# Patient Record
Sex: FEMALE | Race: WHITE | Employment: FULL TIME | URBAN - METROPOLITAN AREA
[De-identification: names, ages, dates, MRNs, and addresses within clinical notes are randomized per-mention and may not be internally consistent; named-entity substitution may affect disease eponyms.]

---

## 2024-03-31 ENCOUNTER — HOSPITAL ENCOUNTER (OUTPATIENT)
Age: 49
Setting detail: OBSERVATION
Discharge: HOME | End: 2024-04-02
Attending: EMERGENCY MEDICINE | Admitting: FAMILY MEDICINE
Payer: COMMERCIAL

## 2024-03-31 ENCOUNTER — APPOINTMENT (OUTPATIENT)
Dept: GENERAL RADIOLOGY | Age: 49
End: 2024-03-31
Payer: COMMERCIAL

## 2024-03-31 DIAGNOSIS — R07.9 CHEST PAIN, UNSPECIFIED TYPE: Primary | ICD-10-CM

## 2024-03-31 DIAGNOSIS — R00.2 PALPITATIONS: ICD-10-CM

## 2024-03-31 DIAGNOSIS — R00.1 BRADYCARDIA: ICD-10-CM

## 2024-03-31 LAB
ALBUMIN SERPL-MCNC: 4.5 G/DL (ref 3.4–5)
ALBUMIN/GLOB SERPL: 1.5 {RATIO} (ref 1.1–2.2)
ALP SERPL-CCNC: 114 U/L (ref 40–129)
ALT SERPL-CCNC: 48 U/L (ref 10–40)
ANION GAP SERPL CALCULATED.3IONS-SCNC: 15 MMOL/L (ref 3–16)
ANTI-XA UNFRAC HEPARIN: <0.1 IU/ML (ref 0.3–0.7)
APTT BLD: 31.9 SEC (ref 22.7–35.9)
AST SERPL-CCNC: 28 U/L (ref 15–37)
BASOPHILS # BLD: 0 K/UL (ref 0–0.2)
BASOPHILS NFR BLD: 0.9 %
BILIRUB SERPL-MCNC: 0.5 MG/DL (ref 0–1)
BUN SERPL-MCNC: 12 MG/DL (ref 7–20)
CALCIUM SERPL-MCNC: 9.9 MG/DL (ref 8.3–10.6)
CHLORIDE SERPL-SCNC: 100 MMOL/L (ref 99–110)
CO2 SERPL-SCNC: 23 MMOL/L (ref 21–32)
CREAT SERPL-MCNC: 0.7 MG/DL (ref 0.6–1.1)
DEPRECATED RDW RBC AUTO: 13 % (ref 12.4–15.4)
EOSINOPHIL # BLD: 0.1 K/UL (ref 0–0.6)
EOSINOPHIL NFR BLD: 1.9 %
GFR SERPLBLD CREATININE-BSD FMLA CKD-EPI: >90 ML/MIN/{1.73_M2}
GLUCOSE SERPL-MCNC: 131 MG/DL (ref 70–99)
HCG SERPL QL: NEGATIVE
HCT VFR BLD AUTO: 42.8 % (ref 36–48)
HGB BLD-MCNC: 14.3 G/DL (ref 12–16)
INR PPP: 1.04 (ref 0.84–1.16)
LYMPHOCYTES # BLD: 0.9 K/UL (ref 1–5.1)
LYMPHOCYTES NFR BLD: 15.8 %
MCH RBC QN AUTO: 31.6 PG (ref 26–34)
MCHC RBC AUTO-ENTMCNC: 33.4 G/DL (ref 31–36)
MCV RBC AUTO: 94.6 FL (ref 80–100)
MONOCYTES # BLD: 0.4 K/UL (ref 0–1.3)
MONOCYTES NFR BLD: 7.8 %
NEUTROPHILS # BLD: 4.1 K/UL (ref 1.7–7.7)
NEUTROPHILS NFR BLD: 73.6 %
PLATELET # BLD AUTO: 209 K/UL (ref 135–450)
PMV BLD AUTO: 7.7 FL (ref 5–10.5)
POTASSIUM SERPL-SCNC: 3.6 MMOL/L (ref 3.5–5.1)
PROT SERPL-MCNC: 7.6 G/DL (ref 6.4–8.2)
PROTHROMBIN TIME: 13.6 SEC (ref 11.5–14.8)
RBC # BLD AUTO: 4.53 M/UL (ref 4–5.2)
SODIUM SERPL-SCNC: 138 MMOL/L (ref 136–145)
TROPONIN, HIGH SENSITIVITY: 12 NG/L (ref 0–14)
TROPONIN, HIGH SENSITIVITY: 21 NG/L (ref 0–14)
TROPONIN, HIGH SENSITIVITY: 25 NG/L (ref 0–14)
TROPONIN, HIGH SENSITIVITY: 30 NG/L (ref 0–14)
TROPONIN, HIGH SENSITIVITY: 7 NG/L (ref 0–14)
WBC # BLD AUTO: 5.6 K/UL (ref 4–11)

## 2024-03-31 PROCEDURE — 85025 COMPLETE CBC W/AUTO DIFF WBC: CPT

## 2024-03-31 PROCEDURE — 84439 ASSAY OF FREE THYROXINE: CPT

## 2024-03-31 PROCEDURE — G0378 HOSPITAL OBSERVATION PER HR: HCPCS

## 2024-03-31 PROCEDURE — 96366 THER/PROPH/DIAG IV INF ADDON: CPT

## 2024-03-31 PROCEDURE — 85520 HEPARIN ASSAY: CPT

## 2024-03-31 PROCEDURE — 71045 X-RAY EXAM CHEST 1 VIEW: CPT

## 2024-03-31 PROCEDURE — 84484 ASSAY OF TROPONIN QUANT: CPT

## 2024-03-31 PROCEDURE — 36415 COLL VENOUS BLD VENIPUNCTURE: CPT

## 2024-03-31 PROCEDURE — 99285 EMERGENCY DEPT VISIT HI MDM: CPT

## 2024-03-31 PROCEDURE — 6370000000 HC RX 637 (ALT 250 FOR IP): Performed by: EMERGENCY MEDICINE

## 2024-03-31 PROCEDURE — 80053 COMPREHEN METABOLIC PANEL: CPT

## 2024-03-31 PROCEDURE — 85610 PROTHROMBIN TIME: CPT

## 2024-03-31 PROCEDURE — 6370000000 HC RX 637 (ALT 250 FOR IP): Performed by: NURSE PRACTITIONER

## 2024-03-31 PROCEDURE — 84703 CHORIONIC GONADOTROPIN ASSAY: CPT

## 2024-03-31 PROCEDURE — 6360000002 HC RX W HCPCS: Performed by: NURSE PRACTITIONER

## 2024-03-31 PROCEDURE — 96375 TX/PRO/DX INJ NEW DRUG ADDON: CPT

## 2024-03-31 PROCEDURE — 6360000002 HC RX W HCPCS: Performed by: EMERGENCY MEDICINE

## 2024-03-31 PROCEDURE — 84443 ASSAY THYROID STIM HORMONE: CPT

## 2024-03-31 PROCEDURE — 96374 THER/PROPH/DIAG INJ IV PUSH: CPT

## 2024-03-31 PROCEDURE — 85730 THROMBOPLASTIN TIME PARTIAL: CPT

## 2024-03-31 PROCEDURE — 96365 THER/PROPH/DIAG IV INF INIT: CPT

## 2024-03-31 PROCEDURE — 93005 ELECTROCARDIOGRAM TRACING: CPT | Performed by: EMERGENCY MEDICINE

## 2024-03-31 RX ORDER — ACETAMINOPHEN 650 MG/1
650 SUPPOSITORY RECTAL EVERY 6 HOURS PRN
Status: DISCONTINUED | OUTPATIENT
Start: 2024-03-31 | End: 2024-04-02 | Stop reason: HOSPADM

## 2024-03-31 RX ORDER — ASPIRIN 81 MG/1
81 TABLET, CHEWABLE ORAL DAILY
Status: DISCONTINUED | OUTPATIENT
Start: 2024-04-01 | End: 2024-04-02 | Stop reason: HOSPADM

## 2024-03-31 RX ORDER — AMLODIPINE BESYLATE 2.5 MG/1
2.5 TABLET ORAL DAILY
COMMUNITY
Start: 2024-03-27

## 2024-03-31 RX ORDER — METOPROLOL SUCCINATE 50 MG/1
50 TABLET, EXTENDED RELEASE ORAL NIGHTLY
Status: DISCONTINUED | OUTPATIENT
Start: 2024-03-31 | End: 2024-03-31

## 2024-03-31 RX ORDER — ONDANSETRON 2 MG/ML
4 INJECTION INTRAMUSCULAR; INTRAVENOUS EVERY 6 HOURS PRN
Status: DISCONTINUED | OUTPATIENT
Start: 2024-03-31 | End: 2024-04-02 | Stop reason: HOSPADM

## 2024-03-31 RX ORDER — SODIUM CHLORIDE 0.9 % (FLUSH) 0.9 %
5-40 SYRINGE (ML) INJECTION PRN
Status: DISCONTINUED | OUTPATIENT
Start: 2024-03-31 | End: 2024-04-02 | Stop reason: HOSPADM

## 2024-03-31 RX ORDER — METOPROLOL SUCCINATE 25 MG/1
25 TABLET, EXTENDED RELEASE ORAL NIGHTLY
Status: DISCONTINUED | OUTPATIENT
Start: 2024-03-31 | End: 2024-04-02 | Stop reason: HOSPADM

## 2024-03-31 RX ORDER — SODIUM CHLORIDE 0.9 % (FLUSH) 0.9 %
5-40 SYRINGE (ML) INJECTION EVERY 12 HOURS SCHEDULED
Status: DISCONTINUED | OUTPATIENT
Start: 2024-03-31 | End: 2024-04-02 | Stop reason: HOSPADM

## 2024-03-31 RX ORDER — CLORAZEPATE DIPOTASSIUM 7.5 MG/1
7.5 TABLET ORAL 2 TIMES DAILY PRN
COMMUNITY

## 2024-03-31 RX ORDER — HEPARIN SODIUM 1000 [USP'U]/ML
4000 INJECTION, SOLUTION INTRAVENOUS; SUBCUTANEOUS PRN
Status: DISCONTINUED | OUTPATIENT
Start: 2024-03-31 | End: 2024-04-01

## 2024-03-31 RX ORDER — BUSPIRONE HYDROCHLORIDE 5 MG/1
10 TABLET ORAL DAILY
Status: DISCONTINUED | OUTPATIENT
Start: 2024-04-01 | End: 2024-04-01

## 2024-03-31 RX ORDER — ENOXAPARIN SODIUM 100 MG/ML
1 INJECTION SUBCUTANEOUS ONCE
Status: DISCONTINUED | OUTPATIENT
Start: 2024-03-31 | End: 2024-03-31

## 2024-03-31 RX ORDER — HEPARIN SODIUM 1000 [USP'U]/ML
4000 INJECTION, SOLUTION INTRAVENOUS; SUBCUTANEOUS ONCE
Status: COMPLETED | OUTPATIENT
Start: 2024-03-31 | End: 2024-03-31

## 2024-03-31 RX ORDER — HEPARIN SODIUM 10000 [USP'U]/100ML
1000 INJECTION, SOLUTION INTRAVENOUS CONTINUOUS
Status: DISCONTINUED | OUTPATIENT
Start: 2024-03-31 | End: 2024-04-01

## 2024-03-31 RX ORDER — HEPARIN SODIUM 1000 [USP'U]/ML
2000 INJECTION, SOLUTION INTRAVENOUS; SUBCUTANEOUS PRN
Status: DISCONTINUED | OUTPATIENT
Start: 2024-03-31 | End: 2024-04-01

## 2024-03-31 RX ORDER — ATORVASTATIN CALCIUM 40 MG/1
40 TABLET, FILM COATED ORAL NIGHTLY
Status: DISCONTINUED | OUTPATIENT
Start: 2024-03-31 | End: 2024-04-02 | Stop reason: HOSPADM

## 2024-03-31 RX ORDER — POLYETHYLENE GLYCOL 3350 17 G/17G
17 POWDER, FOR SOLUTION ORAL DAILY PRN
Status: DISCONTINUED | OUTPATIENT
Start: 2024-03-31 | End: 2024-04-02 | Stop reason: HOSPADM

## 2024-03-31 RX ORDER — ACETAMINOPHEN 325 MG/1
650 TABLET ORAL EVERY 6 HOURS PRN
Status: DISCONTINUED | OUTPATIENT
Start: 2024-03-31 | End: 2024-04-02 | Stop reason: HOSPADM

## 2024-03-31 RX ORDER — ASPIRIN 325 MG
325 TABLET ORAL ONCE
Status: COMPLETED | OUTPATIENT
Start: 2024-03-31 | End: 2024-03-31

## 2024-03-31 RX ORDER — LORAZEPAM 2 MG/ML
0.5 INJECTION INTRAMUSCULAR ONCE
Status: COMPLETED | OUTPATIENT
Start: 2024-03-31 | End: 2024-03-31

## 2024-03-31 RX ORDER — HYDROXYZINE HYDROCHLORIDE 10 MG/1
25 TABLET, FILM COATED ORAL 3 TIMES DAILY PRN
Status: DISCONTINUED | OUTPATIENT
Start: 2024-03-31 | End: 2024-04-02 | Stop reason: HOSPADM

## 2024-03-31 RX ORDER — METOPROLOL TARTRATE 50 MG/1
50 TABLET, FILM COATED ORAL NIGHTLY
Status: DISCONTINUED | OUTPATIENT
Start: 2024-03-31 | End: 2024-03-31

## 2024-03-31 RX ORDER — SODIUM CHLORIDE 9 MG/ML
INJECTION, SOLUTION INTRAVENOUS PRN
Status: DISCONTINUED | OUTPATIENT
Start: 2024-03-31 | End: 2024-04-02 | Stop reason: HOSPADM

## 2024-03-31 RX ORDER — LORAZEPAM 1 MG/1
1 TABLET ORAL 2 TIMES DAILY
Status: DISCONTINUED | OUTPATIENT
Start: 2024-03-31 | End: 2024-04-01

## 2024-03-31 RX ORDER — ONDANSETRON 4 MG/1
4 TABLET, ORALLY DISINTEGRATING ORAL EVERY 8 HOURS PRN
Status: DISCONTINUED | OUTPATIENT
Start: 2024-03-31 | End: 2024-04-02 | Stop reason: HOSPADM

## 2024-03-31 RX ADMIN — LORAZEPAM 0.5 MG: 2 INJECTION INTRAMUSCULAR; INTRAVENOUS at 20:07

## 2024-03-31 RX ADMIN — ATORVASTATIN CALCIUM 40 MG: 40 TABLET, FILM COATED ORAL at 22:03

## 2024-03-31 RX ADMIN — NITROGLYCERIN 0.5 INCH: 20 OINTMENT TOPICAL at 20:06

## 2024-03-31 RX ADMIN — HEPARIN SODIUM 1000 UNITS/HR: 10000 INJECTION, SOLUTION INTRAVENOUS at 22:04

## 2024-03-31 RX ADMIN — ASPIRIN 325 MG: 325 TABLET ORAL at 20:07

## 2024-03-31 RX ADMIN — HEPARIN SODIUM 4000 UNITS: 1000 INJECTION INTRAVENOUS; SUBCUTANEOUS at 22:04

## 2024-03-31 ASSESSMENT — PAIN SCALES - GENERAL
PAINLEVEL_OUTOF10: 0
PAINLEVEL_OUTOF10: 0

## 2024-03-31 ASSESSMENT — PAIN - FUNCTIONAL ASSESSMENT: PAIN_FUNCTIONAL_ASSESSMENT: 0-10

## 2024-03-31 ASSESSMENT — LIFESTYLE VARIABLES
HOW MANY STANDARD DRINKS CONTAINING ALCOHOL DO YOU HAVE ON A TYPICAL DAY: 3 OR 4
HOW OFTEN DO YOU HAVE A DRINK CONTAINING ALCOHOL: 4 OR MORE TIMES A WEEK

## 2024-03-31 NOTE — ED PROVIDER NOTES
Emergency Department Provider Note  Location: [facility]  ED  3/31/2024     Patient Identification  Pat Prieto is a 48 y.o. female    Chief Complaint  Tachycardia (Began taking amlodipine yesterday and has felt palpitations, weakness, and states she's been getting flushed )          HPI  (History provided by patient)  Patient is a 48-year-old female with a history of hypertension and anxiety GERD daily alcohol use who presents after palpitations and facial flushing today.  She reports she recently started amlodipine took her first dose yesterday and took second dose this morning 5 in the morning.  Reports that she noticed facial flushing in the morning and then had episode few hours ago of \"my heart was fluttering in my chest\".  She reports that she did feel that anxious but her ex  was over and she was little worked up over this.  She reports this is different than her usual anxiety attack because the fluttering was different and it did not go away.  It did resolve by the time she arrived here but she still has a mild residual tightness in the center of her chest.  Associated with nausea but no diaphoresis no shortness of breath.  Patient reports she drank 1 cup of coffee earlier today not unusual for her.  She did drink 4 glasses of wine last night which is not unusual for her.  No history of thyroid disease.      Nursing Notes were all reviewed and agreed with, or any disagreements were addressed in the HPI:  Allergies:   Allergies   Allergen Reactions    Levofloxacin     Penicillins     Sulfa Antibiotics        Past medical history:  has a past medical history of Acute sinusitis, Alcohol abuse, Anxiety, Anxious mood, Depression, GERD (gastroesophageal reflux disease), Hypertension, and Migraine.    Past surgical history:  has a past surgical history that includes Cholecystectomy.    Home medications:   Prior to Admission medications    Medication Sig Start Date End Date Taking?

## 2024-04-01 ENCOUNTER — APPOINTMENT (OUTPATIENT)
Dept: CT IMAGING | Age: 49
End: 2024-04-01
Payer: COMMERCIAL

## 2024-04-01 LAB
ANTI-XA UNFRAC HEPARIN: 0.38 IU/ML (ref 0.3–0.7)
ANTI-XA UNFRAC HEPARIN: 0.41 IU/ML (ref 0.3–0.7)
CHOLEST SERPL-MCNC: 184 MG/DL (ref 0–199)
DEPRECATED RDW RBC AUTO: 13 % (ref 12.4–15.4)
EKG ATRIAL RATE: 56 BPM
EKG DIAGNOSIS: NORMAL
EKG P AXIS: 56 DEGREES
EKG P-R INTERVAL: 174 MS
EKG Q-T INTERVAL: 426 MS
EKG QRS DURATION: 98 MS
EKG QTC CALCULATION (BAZETT): 411 MS
EKG R AXIS: -10 DEGREES
EKG T AXIS: 31 DEGREES
EKG VENTRICULAR RATE: 56 BPM
HCT VFR BLD AUTO: 41 % (ref 36–48)
HDLC SERPL-MCNC: 95 MG/DL (ref 40–60)
HGB BLD-MCNC: 13.9 G/DL (ref 12–16)
LDLC SERPL CALC-MCNC: 73 MG/DL
MCH RBC QN AUTO: 32 PG (ref 26–34)
MCHC RBC AUTO-ENTMCNC: 34 G/DL (ref 31–36)
MCV RBC AUTO: 94.3 FL (ref 80–100)
PLATELET # BLD AUTO: 200 K/UL (ref 135–450)
PMV BLD AUTO: 8 FL (ref 5–10.5)
RBC # BLD AUTO: 4.34 M/UL (ref 4–5.2)
T4 FREE SERPL-MCNC: 1.5 NG/DL (ref 0.9–1.8)
TRIGL SERPL-MCNC: 81 MG/DL (ref 0–150)
TROPONIN, HIGH SENSITIVITY: 15 NG/L (ref 0–14)
TROPONIN, HIGH SENSITIVITY: 20 NG/L (ref 0–14)
TSH SERPL DL<=0.005 MIU/L-ACNC: 4.42 UIU/ML (ref 0.27–4.2)
VLDLC SERPL CALC-MCNC: 16 MG/DL
WBC # BLD AUTO: 5.5 K/UL (ref 4–11)

## 2024-04-01 PROCEDURE — 2580000003 HC RX 258: Performed by: NURSE PRACTITIONER

## 2024-04-01 PROCEDURE — 6360000004 HC RX CONTRAST MEDICATION: Performed by: STUDENT IN AN ORGANIZED HEALTH CARE EDUCATION/TRAINING PROGRAM

## 2024-04-01 PROCEDURE — 93010 ELECTROCARDIOGRAM REPORT: CPT | Performed by: INTERNAL MEDICINE

## 2024-04-01 PROCEDURE — 80061 LIPID PANEL: CPT

## 2024-04-01 PROCEDURE — G0378 HOSPITAL OBSERVATION PER HR: HCPCS

## 2024-04-01 PROCEDURE — 96366 THER/PROPH/DIAG IV INF ADDON: CPT

## 2024-04-01 PROCEDURE — 84484 ASSAY OF TROPONIN QUANT: CPT

## 2024-04-01 PROCEDURE — 99222 1ST HOSP IP/OBS MODERATE 55: CPT | Performed by: STUDENT IN AN ORGANIZED HEALTH CARE EDUCATION/TRAINING PROGRAM

## 2024-04-01 PROCEDURE — 6370000000 HC RX 637 (ALT 250 FOR IP): Performed by: INTERNAL MEDICINE

## 2024-04-01 PROCEDURE — 75574 CT ANGIO HRT W/3D IMAGE: CPT

## 2024-04-01 PROCEDURE — 85027 COMPLETE CBC AUTOMATED: CPT

## 2024-04-01 PROCEDURE — 6370000000 HC RX 637 (ALT 250 FOR IP): Performed by: NURSE PRACTITIONER

## 2024-04-01 PROCEDURE — 85520 HEPARIN ASSAY: CPT

## 2024-04-01 PROCEDURE — 93306 TTE W/DOPPLER COMPLETE: CPT

## 2024-04-01 PROCEDURE — 6370000000 HC RX 637 (ALT 250 FOR IP): Performed by: STUDENT IN AN ORGANIZED HEALTH CARE EDUCATION/TRAINING PROGRAM

## 2024-04-01 PROCEDURE — 83695 ASSAY OF LIPOPROTEIN(A): CPT

## 2024-04-01 PROCEDURE — 36415 COLL VENOUS BLD VENIPUNCTURE: CPT

## 2024-04-01 RX ORDER — CETIRIZINE HYDROCHLORIDE 10 MG/1
10 TABLET ORAL DAILY PRN
Status: DISCONTINUED | OUTPATIENT
Start: 2024-04-01 | End: 2024-04-02 | Stop reason: HOSPADM

## 2024-04-01 RX ORDER — LORAZEPAM 1 MG/1
1 TABLET ORAL 2 TIMES DAILY PRN
Status: DISCONTINUED | OUTPATIENT
Start: 2024-04-01 | End: 2024-04-02 | Stop reason: HOSPADM

## 2024-04-01 RX ORDER — PANTOPRAZOLE SODIUM 40 MG/1
40 TABLET, DELAYED RELEASE ORAL
Status: DISCONTINUED | OUTPATIENT
Start: 2024-04-02 | End: 2024-04-02 | Stop reason: HOSPADM

## 2024-04-01 RX ORDER — AMLODIPINE BESYLATE 2.5 MG/1
2.5 TABLET ORAL DAILY
Status: DISCONTINUED | OUTPATIENT
Start: 2024-04-01 | End: 2024-04-02 | Stop reason: HOSPADM

## 2024-04-01 RX ADMIN — NITROGLYCERIN 1 INCH: 20 OINTMENT TOPICAL at 12:54

## 2024-04-01 RX ADMIN — NITROGLYCERIN 1 INCH: 20 OINTMENT TOPICAL at 18:47

## 2024-04-01 RX ADMIN — ATORVASTATIN CALCIUM 40 MG: 40 TABLET, FILM COATED ORAL at 21:05

## 2024-04-01 RX ADMIN — AMLODIPINE BESYLATE 2.5 MG: 2.5 TABLET ORAL at 12:54

## 2024-04-01 RX ADMIN — LORAZEPAM 1 MG: 1 TABLET ORAL at 08:35

## 2024-04-01 RX ADMIN — IOPAMIDOL 100 ML: 755 INJECTION, SOLUTION INTRAVENOUS at 15:06

## 2024-04-01 RX ADMIN — Medication 10 ML: at 21:05

## 2024-04-01 RX ADMIN — ACETAMINOPHEN 650 MG: 325 TABLET ORAL at 21:05

## 2024-04-01 RX ADMIN — ASPIRIN 81 MG: 81 TABLET, CHEWABLE ORAL at 08:35

## 2024-04-01 RX ADMIN — ACETAMINOPHEN 650 MG: 325 TABLET ORAL at 08:34

## 2024-04-01 RX ADMIN — CETIRIZINE HYDROCHLORIDE 10 MG: 10 TABLET, FILM COATED ORAL at 09:01

## 2024-04-01 RX ADMIN — LORAZEPAM 1 MG: 1 TABLET ORAL at 21:05

## 2024-04-01 ASSESSMENT — PAIN SCALES - GENERAL
PAINLEVEL_OUTOF10: 0

## 2024-04-01 NOTE — PLAN OF CARE
Problem: Cardiovascular - Adult  Goal: Maintains optimal cardiac output and hemodynamic stability  Outcome: Progressing  Flowsheets (Taken 4/1/2024 0015)  Maintains optimal cardiac output and hemodynamic stability:   Monitor blood pressure and heart rate   Monitor urine output and notify Licensed Independent Practitioner for values outside of normal range   Assess for signs of decreased cardiac output     Problem: Cardiovascular - Adult  Goal: Absence of cardiac dysrhythmias or at baseline  Outcome: Progressing  Flowsheets (Taken 4/1/2024 0015)  Absence of cardiac dysrhythmias or at baseline:   Monitor cardiac rate and rhythm   Assess for signs of decreased cardiac output

## 2024-04-01 NOTE — CONSULTS
Pharmacy to Manage Heparin Infusion per Hospital Nomogram    Dx: ACS  Pt wt = 83.3 kg .  Baseline aPTT = Pending    Oral factor Xa-inhibitors may alter and elevate anti-Xa levels used for unfractionated heparin monitoring. As a result, anti-Xa monitoring is not accurate while Xa-inhibitor activity is detectable. Utilize aPTT monitoring when patient received an oral factor Xa-inhibitor (apixaban, betrixaban, edoxaban or rivaroxaban) within 72 hours prior to admission (please document last administration time). The goal is to allow a washout of oral factor Xa-inhibitors by using aPTT for 72 hours, then change to ant-Xa levels for UFH.     Heparin (weight-based) Infusion: CAD/STEMI/NSTEMI/UA/AFib)   Heparin 60 units/kg IVP bolus (max 4,000 units) followed by Heparin infusion at 12 units/kg/hr (recommended max initial rate: 1000 units/hr).  Recheck anti-Xa (unless aPTT being used) in 6 hours.  Goal anti-Xa 0.3-0.7 IU/mL  Goal aPTT =  seconds.  Gama Cardona, Aydin  3/31/2024 9:52 PM    -------------------------------------  4/1 0304  Low-Dose Heparin Drip  Current Rate: 1000 units/hr  4/1 @ 0201 Anti-Xa Level= 0.41  Plan: Per pharmacy dosing, we will not make any changes at this time    Next Anti-Xa Level: 4/1 @ 0800  Muna Chiu PharmD 4/1/2024 3:04 AM  -------------------------------------    4/1/2024  anti-Xa level = 0.38 IU/mL at 0844  Continue Heparin infusion rate at 1000 units/hr   Daily level ordered  Next anti-Xa tomorrow (4/2) AM  Ita Disla PharmD  -------------------------------------      4/1/2024 9:17 AM   
Consult Placed     Who: [facility]  Date: 4/1/2024   Time: 8:26 AM   
auscultation bilaterally, no wheezes, no rales, no respiratory distress   Chest Wall:  No deformity or tenderness to palpation   Heart:  Regular rate and rhythm, normal S1, normal S2, no murmur, no rub, no S3/S4, PMI non-displaced.    Abdomen:   Soft, non-tender, with normoactive bowel sounds.    Extremities: No cyanosis, clubbing or pitting edema.    Vascular: 2+ radial. Brisk carotid upstrokes without carotid bruit.    Skin: Skin color, texture, turgor are normal with no rashes or ulceration.    Pysch: Euthymic mood, appropriate affect   Neurologic: Oriented to person, place and time. No slurred speech or facial asymmetry. No motor or sensory deficits on gross examination.         Labs:   CBC:   Lab Results   Component Value Date/Time    WBC 5.5 04/01/2024 02:01 AM    RBC 4.34 04/01/2024 02:01 AM    HGB 13.9 04/01/2024 02:01 AM    HCT 41.0 04/01/2024 02:01 AM    MCV 94.3 04/01/2024 02:01 AM    RDW 13.0 04/01/2024 02:01 AM     04/01/2024 02:01 AM     CMP:  Lab Results   Component Value Date/Time     03/31/2024 05:00 PM    K 3.6 03/31/2024 05:00 PM     03/31/2024 05:00 PM    CO2 23 03/31/2024 05:00 PM    BUN 12 03/31/2024 05:00 PM    CREATININE 0.7 03/31/2024 05:00 PM    GFRAA >60 07/21/2014 10:25 PM    GFRAA >60 05/01/2013 10:05 AM    AGRATIO 1.5 03/31/2024 05:00 PM    LABGLOM >90 03/31/2024 05:00 PM    GLUCOSE 131 03/31/2024 05:00 PM    PROT 7.6 03/31/2024 05:00 PM    PROT 7.1 03/09/2012 03:05 PM    CALCIUM 9.9 03/31/2024 05:00 PM    BILITOT 0.5 03/31/2024 05:00 PM    ALKPHOS 114 03/31/2024 05:00 PM    AST 28 03/31/2024 05:00 PM    ALT 48 03/31/2024 05:00 PM     PT/INR:  No results found for: \"PTINR\"  HgBA1c:No results found for: \"LABA1C\"  Lab Results   Component Value Date    TROPONINI <0.006 03/09/2012       Lab Results   Component Value Date    CHOL 151 05/01/2013     Lab Results   Component Value Date    TRIG 46 05/01/2013     Lab Results   Component Value Date    HDL 83 (H) 05/01/2013

## 2024-04-01 NOTE — H&P
[facility] Medicine History & Physical        Date of Service: 03/31/2024    Time of Service: 2010    Disposition:    []Admitted to inpatient status with expected LOS greater than two midnights due to medical therapy.  [x]Placed in observation status.    Historian: Self/patient, chart review, Care Everywhere, ED documentation    Chief Admission Complaint:  Chest pressure, increased palpitations, facial flushing    Presenting Admission History:      Pat Prieto is a/an 48 y.o. female with a significant past medical history of hypertension, palpitations, chronic alcohol abuse, GERD, and depression with anxiety who presents to [facility] ED with complaint of facial flushing, increasing anxiety, palpitations, and chest pressure that all began acutely around 1400 today. She notes a longstanding history of palpitations, has been on atenolol long term that was successful in treating them, but was recently switched to Toprol XL 25 mg nightly.  She notes that she was at rest during the onset of the symptoms, began feeling like she was having excessive palpitations and the sensation of tachycardia, began feeling hot in the face and also having chest pressure on the bottom portion.  She denies any true pain, pressure is nonradiating, did not have any diaphoresis, no nausea vomiting.  These symptoms lasted for approximate 2 to 3 hours, did not intensify or improve, which prompted her to come here for an evaluation. Of note, she recently was started on amlodipine for BP control, first dose yesterday, last dose today during the morning. Her evaluation included laboratory studies, EKG, and chest x-ray.  Chest x-ray was negative for any acute findings.  EKG showed sinus bradycardia with a ventricular rate of 56 bpm without any acute ST segment or T wave changes.  Laboratory studies were reviewed and pertinent for normal electrolytes, normal renal function, normal cell count; troponin did trend upward from 7-12 then to 21 and now 30.

## 2024-04-01 NOTE — PLAN OF CARE
4 Eyes Skin Assessment     NAME:  Pat Prieto  YOB: 1975  MEDICAL RECORD NUMBER:  [id number]    The patient is being assessed for  Admission    I agree that at least one RN has performed a thorough Head to Toe Skin Assessment on the patient. ALL assessment sites listed below have been assessed.      Areas assessed by both nurses:    Head, Face, Ears, Shoulders, Back, Chest, Arms, Elbows, Hands, Sacrum. Buttock, Coccyx, Ischium, Legs. Feet and Heels, and Under Medical Devices         Does the Patient have a Wound? No noted wound(s)       Tim Prevention initiated by RN: No  Wound Care Orders initiated by RN: No    Pressure Injury (Stage 3,4, Unstageable, DTI, NWPT, and Complex wounds) if present, place Wound referral order by RN under : No    New Ostomies, if present place, Ostomy referral order under : No     Nurse 1 eSignature: Electronically signed by Fernanda Paul RN on 4/1/24 at 3:33 AM EDT    **SHARE this note so that the co-signing nurse can place an eSignature**    Nurse 2 eSignature: {Esignature:[id number]}

## 2024-04-01 NOTE — ACP (ADVANCE CARE PLANNING)
Advance Care Planning     General Advance Care Planning (ACP) Conversation    Date of Conversation: 3/31/2024  Conducted with: Patient with Decision Making Capacity   Healthcare Decision Maker: Next of Kin by law (only applies in absence of above) (name) juan carlos Prieto [phone]    Healthcare Decision Maker:  No healthcare decision makers have been documented.  Click here to complete HealthCare Decision Makers including selection of the Healthcare Decision Maker Relationship (ie \"Primary\")  Today we documented Decision Maker(s) consistent with Legal Next of Kin hierarchy.    Content/Action Overview:  Has NO ACP documents-Information provided  Reviewed DNR/DNI and patient elects Full Code (Attempt Resuscitation)      Length of Voluntary ACP Conversation in minutes:  <16 minutes (Non-Billable)    India Bowles RN

## 2024-04-01 NOTE — CARE COORDINATION
Case Management Assessment  Initial Evaluation    Date/Time of Evaluation: 4/1/2024 12:54 PM  Assessment Completed by: India Bowles RN    If patient is discharged prior to next notation, then this note serves as note for discharge by case management.    Patient Name: Pat Prieto                   YOB: 1975  Diagnosis: Palpitations [R00.2]  Chest pain [R07.9]  Chest pain, unspecified type [R07.9]                   Date / Time: 3/31/2024  4:45 PM    Patient Admission Status: Observation   Readmission Risk (Low < 19, Mod (19-27), High > 27): No data recorded  Current PCP: Alex Prather MD  PCP verified by CM?      Chart Reviewed: Yes      History Provided by: Patient  Patient Orientation: Alert and Oriented, Person, Place, Situation    Patient Cognition: Alert    Hospitalization in the last 30 days (Readmission):  No    If yes, Readmission Assessment in  Navigator will be completed.    Advance Directives:      Code Status: Full Code   Patient's Primary Decision Maker is: Legal Next of Kin      Discharge Planning:    Patient lives with: Children, Family Members Type of Home: House  Primary Care Giver: Self  Patient Support Systems include: Children, Family Members   Current Financial resources:    Current community resources:    Current services prior to admission: None            Current DME:              Type of Home Care services:  None    ADLS  Prior functional level:    Current functional level:      PT AM-PAC:   /24  OT AM-PAC:   /24    Family can provide assistance at DC:    Would you like Case Management to discuss the discharge plan with any other family members/significant others, and if so, who?    Plans to Return to Present Housing:    Other Identified Issues/Barriers to RETURNING to current housing:   Potential Assistance needed at discharge: N/A            Potential DME:    Patient expects to discharge to: House  Plan for transportation at discharge:      Financial    Payor:

## 2024-04-01 NOTE — ACP (ADVANCE CARE PLANNING)
Advance Care Planning     Advance Care Planning Inpatient Note  [facility] Department    Today's Date: 4/1/2024  Unit: [facility] B3 - MED SURG    Received request from IDT Member.  Upon review of chart and communication with care team, patient's decision making abilities are not in question.. Patient and Child/Children was/were present in the room during visit.    Goals of ACP Conversation:  Discuss advance care planning documents    Health Care Decision Makers:     Summary: Patient is not legally .  She has 3 adult children.  She is unsure who she would pick to be a proxy decision maker, but is considering her daughter who has more medical background.  No Decision Maker named by patient at this time    Advance Care Planning Documents (Patient Wishes):  None     Assessment:  Patient states she knows it is important to understand who would be best for the job and understands all three of her children would have equal decision making ability, unless she completes a legal document stating otherwise.  Interventions:  Provided education on documents for clarity and greater understanding  Discussed and provided education on state decision maker hierarchy  Encouraged ongoing ACP conversation with future decision makers and loved ones    Care Preferences Communicated:   Ventilation:   If the patient, in their present state of health, suddenly became very ill and unable to breathe on their own,     the patient would desire the use of a ventilator (breathing machine).    If their health worsens and it becomes clear that the change of recovery is unlikely,     Patient is uncertain what her choices would be and would want decisions based on the condition at the time.    Resuscitation:  Patient currently wants to be a full code and is uncertain what she would choose for a more serious condition.       Outcomes/Plan:  ACP Discussion: Completed.  Patient knows all three of her adult children would be her proxy at this point.

## 2024-04-01 NOTE — ED NOTES
Ms. Prieto is a 48 y.o. female who had concerns including Tachycardia (Began taking amlodipine yesterday and has felt palpitations, weakness, and states she's been getting flushed ).    Chief Complaint   Patient presents with    Tachycardia     Began taking amlodipine yesterday and has felt palpitations, weakness, and states she's been getting flushed        She is being admitted for:    Chest pain    Her ED problem list included:    1. Chest pain, unspecified type    2. Palpitations        Past Medical History:   Diagnosis Date    Acute sinusitis     Alcohol abuse     Anxiety     Anxious mood     Depression     GERD (gastroesophageal reflux disease)     Hypertension     Migraine        Past Surgical History:   Procedure Laterality Date    CHOLECYSTECTOMY         Her recent abnormal labs were:    Labs Reviewed   CBC WITH AUTO DIFFERENTIAL - Abnormal; Notable for the following components:       Result Value    Lymphocytes Absolute 0.9 (*)     All other components within normal limits   COMPREHENSIVE METABOLIC PANEL W/ REFLEX TO MG FOR LOW K - Abnormal; Notable for the following components:    Glucose 131 (*)     ALT 48 (*)     All other components within normal limits   TROPONIN - Abnormal; Notable for the following components:    Troponin, High Sensitivity 21 (*)     All other components within normal limits   TROPONIN - Abnormal; Notable for the following components:    Troponin, High Sensitivity 30 (*)     All other components within normal limits   TROPONIN   TROPONIN   TSH WITH REFLEX TO FT4       Her vital signs for the encounter were:    Patient Vitals for the past 24 hrs:   BP Temp Temp src Pulse Resp SpO2 Height Weight   03/31/24 2006 (!) 158/88 -- -- 58 -- -- -- --   03/31/24 1741 (!) 147/74 -- -- 58 11 100 % -- --   03/31/24 1650 (!) 163/93 97.5 °F (36.4 °C) Oral 69 18 100 % 1.727 m (5' 8\") 85.7 kg (189 lb)        She has the following lines:    Peripheral IV 03/31/24 Right Forearm (Active)       She has

## 2024-04-01 NOTE — PLAN OF CARE
Problem: Discharge Planning  Goal: Discharge to home or other facility with appropriate resources  Outcome: Progressing  Flowsheets (Taken 4/1/2024 0912 by Tracey Marino, DELMY)  Discharge to home or other facility with appropriate resources: Identify barriers to discharge with patient and caregiver     Problem: Pain  Goal: Verbalizes/displays adequate comfort level or baseline comfort level  Outcome: Progressing     Problem: Cardiovascular - Adult  Goal: Maintains optimal cardiac output and hemodynamic stability  4/1/2024 1212 by Vicenta Velez  Outcome: Progressing  Flowsheets (Taken 4/1/2024 0912 by Tracey Marino RN)  Maintains optimal cardiac output and hemodynamic stability: Monitor blood pressure and heart rate  4/1/2024 0015 by Fernanda Paul RN  Outcome: Progressing  Flowsheets (Taken 4/1/2024 0015)  Maintains optimal cardiac output and hemodynamic stability:   Monitor blood pressure and heart rate   Monitor urine output and notify Licensed Independent Practitioner for values outside of normal range   Assess for signs of decreased cardiac output  Goal: Absence of cardiac dysrhythmias or at baseline  4/1/2024 1212 by Vicenta Velez  Outcome: Progressing  Flowsheets (Taken 4/1/2024 0912 by Tracey Marino RN)  Absence of cardiac dysrhythmias or at baseline: Monitor cardiac rate and rhythm  4/1/2024 0015 by Fernanda Paul RN  Outcome: Progressing  Flowsheets (Taken 4/1/2024 0015)  Absence of cardiac dysrhythmias or at baseline:   Monitor cardiac rate and rhythm   Assess for signs of decreased cardiac output     Problem: Metabolic/Fluid and Electrolytes - Adult  Goal: Hemodynamic stability and optimal renal function maintained  Outcome: Progressing  Flowsheets (Taken 4/1/2024 0912 by Tracey Marino RN)  Hemodynamic stability and optimal renal function maintained: Monitor labs and assess for signs and symptoms of volume excess or deficit

## 2024-04-02 ENCOUNTER — TELEPHONE (OUTPATIENT)
Dept: CARDIAC CATH/INVASIVE PROCEDURES | Age: 49
End: 2024-04-02

## 2024-04-02 VITALS
WEIGHT: 183.3 LBS | HEIGHT: 68 IN | BODY MASS INDEX: 27.78 KG/M2 | TEMPERATURE: 97.6 F | HEART RATE: 74 BPM | SYSTOLIC BLOOD PRESSURE: 132 MMHG | RESPIRATION RATE: 16 BRPM | OXYGEN SATURATION: 99 % | DIASTOLIC BLOOD PRESSURE: 88 MMHG

## 2024-04-02 PROBLEM — R00.1 BRADYCARDIA: Status: ACTIVE | Noted: 2024-04-02

## 2024-04-02 PROBLEM — I77.810 ASCENDING AORTA DILATATION (HCC): Status: ACTIVE | Noted: 2024-04-02

## 2024-04-02 PROBLEM — I35.1 NONRHEUMATIC AORTIC VALVE INSUFFICIENCY: Status: ACTIVE | Noted: 2024-04-02

## 2024-04-02 PROBLEM — I21.4 NSTEMI (NON-ST ELEVATED MYOCARDIAL INFARCTION) (HCC): Status: ACTIVE | Noted: 2024-04-02

## 2024-04-02 LAB — LPA SERPL-MCNC: 112 MG/DL

## 2024-04-02 PROCEDURE — G0378 HOSPITAL OBSERVATION PER HR: HCPCS

## 2024-04-02 PROCEDURE — 6370000000 HC RX 637 (ALT 250 FOR IP): Performed by: STUDENT IN AN ORGANIZED HEALTH CARE EDUCATION/TRAINING PROGRAM

## 2024-04-02 PROCEDURE — 99232 SBSQ HOSP IP/OBS MODERATE 35: CPT | Performed by: NURSE PRACTITIONER

## 2024-04-02 PROCEDURE — 6370000000 HC RX 637 (ALT 250 FOR IP): Performed by: NURSE PRACTITIONER

## 2024-04-02 PROCEDURE — 2580000003 HC RX 258: Performed by: NURSE PRACTITIONER

## 2024-04-02 RX ORDER — METOPROLOL SUCCINATE 25 MG/1
25 TABLET, EXTENDED RELEASE ORAL NIGHTLY
Qty: 30 TABLET | Refills: 3 | Status: SHIPPED | OUTPATIENT
Start: 2024-04-02 | End: 2024-04-02

## 2024-04-02 RX ORDER — METOPROLOL SUCCINATE 25 MG/1
25 TABLET, EXTENDED RELEASE ORAL NIGHTLY
Qty: 30 TABLET | Refills: 3 | Status: SHIPPED | OUTPATIENT
Start: 2024-04-02

## 2024-04-02 RX ADMIN — ACETAMINOPHEN 650 MG: 325 TABLET ORAL at 08:15

## 2024-04-02 RX ADMIN — LORAZEPAM 1 MG: 1 TABLET ORAL at 09:22

## 2024-04-02 RX ADMIN — Medication 10 ML: at 08:15

## 2024-04-02 RX ADMIN — ASPIRIN 81 MG: 81 TABLET, CHEWABLE ORAL at 08:15

## 2024-04-02 RX ADMIN — NITROGLYCERIN 1 INCH: 20 OINTMENT TOPICAL at 00:01

## 2024-04-02 RX ADMIN — AMLODIPINE BESYLATE 2.5 MG: 2.5 TABLET ORAL at 08:15

## 2024-04-02 RX ADMIN — PANTOPRAZOLE SODIUM 40 MG: 40 TABLET, DELAYED RELEASE ORAL at 08:15

## 2024-04-02 NOTE — DISCHARGE INSTRUCTIONS
unreturned phone.    o Discard the used patch in the trash    o Return the phone,  and any unopened additional patches or adhesives in the pre-paid mailing pouch.    o Put in your mailbox or drop off at the post office.        CONTACT [employer] CUST[employer]ER SUPPORT:  [phone]

## 2024-04-02 NOTE — PROGRESS NOTES
Patient discharged home. avs and scripts given. Patient educated using teach back method.   
Referred by: Clayton Hernandez DO; Medical Diagnosis (from order):      Daily Treatment Note -  Physical Therapy    Visit:  4     SUBJECTIVE                                                                                                             Patient reports she is taking less tylenol. She still has morning discomfort in her lumbar spine. She has a good car ride to [place]. She does have some left groin discomfort from stepping in the hose.     OBJECTIVE                                                                                                                        TREATMENT                                                                                                                  Therapeutic Exercise:  Hip thrust marching 2 x 10  TRA marching  Required cues   Standing Good mornings 2 x 10  Prone kicks 2 x 10  Trunk rotation w/ green band 2 x 10  Paloff press 2 x 10 each side  Paloff press marching  2 x 10   Sit to stands 2 x10  nustep 5 min lvl 5  Not done today   Added: counter stretch  sktc  Supine hamstrings  Bridge w adductors          Manual Therapy:  Not done today  7/13/21 BLE stretching  STM T/L/S spine    Skilled input: verbal instruction/cues    Home Exercise Program:   Hip thrust 2 x 10  TRA marching 2 x 10  Seated Good mornings 2 x 10  Trunk rotation w/ red tubing 2 x 10  Paloff press 2 x 10 each side  Paloff press marching  2 x 10   Sit to stands 2 x10    Access Code: [email or web address]  URL: [email or web address]  Date: 07/13/2021  Prepared by: Marly Bahena    Exercises  Supine Hamstring Stretch - 2 x daily - 7 x weekly - 2 sets - 2 reps - 45 hold  Supine Bridge with Mini Swiss Ball Between Knees - 2 x daily - 7 x weekly - 10 reps - 10 hold  Standing Lumbar Spine Flexion Stretch Counter - 2 x daily - 7 x weekly - 2 reps - 45 hold  Supine Single Knee to Chest Stretch - 2 x daily - 7 x weekly - 2 sets - 2 reps - 45 hold    Added counter stretch       ASSESSMENT            
                                                                                                 Patient progressed in core exercises preforming prone kicks. Patient completed the workout with no provocation of LBP. Patient demonstrated early onset of core and glute fatigue with table exercises. Patient requires skilled PT to improve glute and core strength to reach therapy goals.         GOALS                                                                                                                           Improve Oswestry by 10 pts to support overall lumbar function.   The above improvements in impairments to assist in obtaining goals listed below  Long Term Goals: to be met by end of plan of care  1. Independent with HEP   2. Achieve 30 mins of standing activities with no provocation of lumbar stiffness or weakness and zero verbal cuing for upright posture.       Therapy procedure time and total treatment time can be found documented on the Time Entry flowsheet  
pain      NSTEMI/Chest Pain/Palpitations  - Symptoms of pressure, palpitations, facial flushing  - Troponin trending upward, continue to trend to capture downtrend  - Full-dose enoxaparin 1mg/kg x 1 now  - Cardiology consult placed, apprec recs  - Clear liquid diet starting at [place]  - Defer further testing to cardiology team due to elevated troponin  - Checked TSH    -CTA coronary  ordered  -Echo ordered(ef 55%, no wm abn, lv diastolic fcn wnl, trace mr)    Hypertension, Uncontrolled  - Elevated BP trend in ED  - Will avoid labetalol due to bradycardia  - Nitroglycerin topical applied  - was on amlodipine     Sinus Bradycardia  - Longstanding bradycardia noted on prior OV notes  - Hold metoprolol for now until Cardiology eval     Depression/Anxiety  - Continue buspirone, hydroxyzine       Physical Exam Performed:      General appearance:  No apparent distress  Respiratory:  Normal respiratory effort.   Cardiovascular:  Regular rate and rhythm.  Abdomen:  Soft, non-tender, non-distended.  Musculoskeletal:  No edema  Neurologic:  Non-focal  Psychiatric:  Alert and oriented    BP (!) 142/90   Pulse 56   Temp 98.1 °F (36.7 °C) (Oral)   Resp 18   Ht 1.727 m (5' 8\")   Wt 83.4 kg (183 lb 14.4 oz)   LMP 03/10/2024 (Approximate)   SpO2 100%   BMI 27.96 kg/m²     Diet: ADULT DIET; Regular  DVT Prophylaxis: [x]PPx LMWH  []SQ Heparin  []IPC/SCDs  []Eliquis  []Xarelto  []Coumadin  []Other -      Code status: Full Code  PT/OT Eval Status:   [x]NOT yet ordered  []Ordered and Pending   []Seen with Recommendations for:  []Home independently  []Home w/ assist  []HHC  []SNF  []Acute Rehab    Anticipated Discharge Day/Date:  Pending cards recs    Anticipated Discharge Location: [x]Home  []HHC  []SNF  []Acute Rehab  []ECF  []LTAC  []Hospice  []Other -      Consults:      IP CONSULT TO HOSPITALIST  IP CONSULT TO CARDIOLOGY  IP CONSULT TO SPIRITUAL SERVICES      This patient has a high likelihood of being discharged tomorrow and is 
score 4/1/2024:  FINDINGS:  Coronary arteries:   Calcium score 1.  Score right coronary artery 1   Right:   Right coronary artery originates from the right coronary artery cusp.  No significant plaque.  No flow limiting stenosis.  Right coronary artery gives rise to patent PDA branch and posterolateral branch.  It is a right dominant circulation   Left:    Left coronary artery originates from the left coronary cusp   Left main coronary artery appears patent   Circumflex coronary artery gives rise to patent obtuse marginal branch.  No significant plaque.  No flow limiting stenosis.  Circumflex coronary artery is a small vessel   Left anterior descending coronary artery is visualized to the left ventricular apex.  No significant plaque.  No flow limiting stenosis.    Left anterior descending coronary artery gives rise to patent diagonal branches.     Mediastinum: No pericardial effusion.  No pericardial calcification.  No significant mediastinal or hilar adenopathy.  No aortic valve calcification.  No mitral valve calcification.  No embolus seen on limited images of the pulmonary arteries.  Ascending aorta measures 3.7 cm.    There is a questionable flow jet from the left atrium into the right atrium as seen on image 99   Lungs/Pleura: Scattered bandlike opacities are seen throughout the lungs, likely atelectasis or scar.    Upper Abdomen: Low attenuation is seen throughout the liver, compatible with fatty infiltration   Soft Tissues/Bones: Spurring is seen in the spine.    IMPRESSION:  No significant plaque or flow limiting stenosis noted in the coronary arteries.   Possible atrial septal defect. There is a questionable flow jet from the left atrium into the right atrium as seen on image 99.  Consider ECHO correlation if clinical management will change    Calcium score 1.  This is in the 70th percentile for patient age    Echocardiogram 4/1/2024:    Summary   Normal left ventricle size, wall thickness and systolic

## 2024-04-02 NOTE — DISCHARGE SUMMARY
artery is visualized to the left ventricular apex.  No significant plaque.  No flow limiting stenosis. Left anterior descending coronary artery gives rise to patent diagonal branches. Mediastinum: No pericardial effusion.  No pericardial calcification.  No significant mediastinal or hilar adenopathy.  No aortic valve calcification. No mitral valve calcification.  No embolus seen on limited images of the pulmonary arteries.  Ascending aorta measures 3.7 cm. There is a questionable flow jet from the left atrium into the right atrium as seen on image 99 Lungs/Pleura: Scattered bandlike opacities are seen throughout the lungs, likely atelectasis or scar. Upper Abdomen: Low attenuation is seen throughout the liver, compatible with fatty infiltration Soft Tissues/Bones: Spurring is seen in the spine.     No significant plaque or flow limiting stenosis noted in the coronary arteries. Possible atrial septal defect. There is a questionable flow jet from the left atrium into the right atrium as seen on image 99.  Consider ECHO correlation if clinical management will change Calcium score 1.  This is in the 70th percentile for patient age     XR CHEST PORTABLE    Result Date: 3/31/2024  EXAMINATION: ONE XRAY VIEW OF THE CHEST 3/31/2024 5:13 pm COMPARISON: 03/09/2012 HISTORY: ORDERING SYSTEM PROVIDED HISTORY: palpitations TECHNOLOGIST PROVIDED HISTORY: Reason for exam:->palpitations Reason for Exam: heart beating fast FINDINGS: The heart is top-normal in size.  The pulmonary vessels are normal.  No consolidation or effusion is seen.  The bones are intact.     No acute cardiopulmonary abnormality.       Consults:     IP CONSULT TO HOSPITALIST  IP CONSULT TO CARDIOLOGY  IP CONSULT TO SPIRITUAL SERVICES    Labs:     Recent Labs     03/31/24  1700 04/01/24  0201   WBC 5.6 5.5   HGB 14.3 13.9   HCT 42.8 41.0    200     Recent Labs     03/31/24  1700      K 3.6      CO2 23   BUN 12   CREATININE 0.7   CALCIUM 9.9

## 2024-04-02 NOTE — TELEPHONE ENCOUNTER
Monitor company [employer]  Length of monitor 30 days  Monitor ordered by Dr. Sanjay Figueredo    Patch ID [id number]  Device number [id number]  Monitor given to Elis Downing RN.   Nurse to apply at the time of discharge.

## 2024-04-02 NOTE — CARE COORDINATION
Chart reviewed day 2. Care provided by cards and IM. Pt is from home with her mother and daughter. She reports she is IPTA and denies needs. Pt had CTA and echo yesterday. Will continue to follow course for needs. India Bowles RN

## 2024-04-02 NOTE — PLAN OF CARE
Problem: Discharge Planning  Goal: Discharge to home or other facility with appropriate resources  4/2/2024 1542 by Elis Downing RN  Outcome: Completed  4/2/2024 0156 by Deven Doss RN  Outcome: Progressing     Problem: Pain  Goal: Verbalizes/displays adequate comfort level or baseline comfort level  4/2/2024 1542 by Elis Downing RN  Outcome: Completed  4/2/2024 0156 by Deven Doss RN  Outcome: Progressing     Problem: Cardiovascular - Adult  Goal: Maintains optimal cardiac output and hemodynamic stability  4/2/2024 1542 by Elis Downing RN  Outcome: Completed  4/2/2024 0156 by Deven Doss RN  Outcome: Progressing  Goal: Absence of cardiac dysrhythmias or at baseline  4/2/2024 1542 by Elis Downing RN  Outcome: Completed  4/2/2024 0156 by Deven Doss RN  Outcome: Progressing     Problem: Metabolic/Fluid and Electrolytes - Adult  Goal: Hemodynamic stability and optimal renal function maintained  4/2/2024 1542 by Elis Downing RN  Outcome: Completed  4/2/2024 0156 by Deven Doss RN  Outcome: Progressing

## 2024-04-02 NOTE — PLAN OF CARE
Problem: Discharge Planning  Goal: Discharge to home or other facility with appropriate resources  Outcome: Progressing    Problem: Pain  Goal: Verbalizes/displays adequate comfort level or baseline comfort level  Outcome: Progressing     Problem: Cardiovascular - Adult  Goal: Maintains optimal cardiac output and hemodynamic stability  Outcome: Progressing    Goal: Absence of cardiac dysrhythmias or at baseline  Outcome: Progressing     Problem: Metabolic/Fluid and Electrolytes - Adult  Goal: Hemodynamic stability and optimal renal function maintained  Outcome: Progressing

## 2024-05-17 ENCOUNTER — TELEPHONE (OUTPATIENT)
Dept: CARDIOLOGY CLINIC | Age: 49
End: 2024-05-17

## 2024-05-17 DIAGNOSIS — R00.1 BRADYCARDIA: ICD-10-CM

## 2024-05-17 DIAGNOSIS — R00.2 PALPITATIONS: ICD-10-CM

## 2024-05-17 NOTE — TELEPHONE ENCOUNTER
----- Message from Sanjay Figueredo DO sent at 5/17/2024  4:18 PM EDT -----  Please call patient and let them know that his cardiac event monitor did not demonstrate any significant arrhythmias.  Average heart rate was 68 bpm.  So sinus bradycardia we saw in the hospital has improved.  There was mention of sinus bradycardia or slow heart rates at night.  This may be seen with sleep apnea.  [facility]  It appears that this may be the case when reviewing the last note prior to her discharge in April.  If so can you find out the provider we can forward the results. Thanks.

## 2024-05-20 NOTE — TELEPHONE ENCOUNTER
Patient informed. She has a sleep apnea test scheduled in August. Faxed results to Dr. Rivas who she follows with.